# Patient Record
Sex: MALE | Race: WHITE | URBAN - METROPOLITAN AREA
[De-identification: names, ages, dates, MRNs, and addresses within clinical notes are randomized per-mention and may not be internally consistent; named-entity substitution may affect disease eponyms.]

---

## 2018-03-01 ENCOUNTER — OFFICE VISIT (OUTPATIENT)
Dept: NEUROPSYCHOLOGY | Facility: CLINIC | Age: 33
End: 2018-03-01
Payer: MEDICARE

## 2018-03-01 DIAGNOSIS — Q90.9 DOWN SYNDROME: ICD-10-CM

## 2018-03-01 DIAGNOSIS — F70 MILD INTELLECTUAL DISABILITY: ICD-10-CM

## 2018-03-01 DIAGNOSIS — R41.3 COMPLAINTS OF MEMORY DISTURBANCE: Primary | ICD-10-CM

## 2018-03-01 NOTE — MR AVS SNAPSHOT
After Visit Summary   3/1/2018    Roberto Patrick    MRN: [id number]           Patient Information     Date Of Birth          1985        Visit Information        Provider Department      3/1/2018 8:00 AM Salvatore Truong, PhD  Health Neuropsychology        Today's Diagnoses     Complaints of memory disturbance    -  1    Mild intellectual disability        Down syndrome           Follow-ups after your visit        Who to contact     Please call your clinic at [phone] to:    Ask questions about your health    Make or cancel appointments    Discuss your medicines    Learn about your test results    Speak to your doctor            Additional Information About Your Visit        MyChart Information     [employer] (powering [employer]) is an electronic gateway that provides easy, online access to your medical records. With [employer] (powering [employer]), you can request a clinic appointment, read your test results, renew a prescription or communicate with your care team.     To sign up for [employer] visit the website at [email or web address]   You will be asked to enter the access code listed below, as well as some personal information. Please follow the directions to create your username and password.     Your access code is: [email or web address]  Expires: 2018  6:30 AM     Your access code will  in 90 days. If you need help or a new code, please contact your [facility] or call [phone] for assistance.        Care EveryWhere ID     This is your Care EveryWhere ID. This could be used by other organizations to access your [place] medical records  [id number]         Blood Pressure from Last 3 Encounters:   No data found for BP    Weight from Last 3 Encounters:   No data found for Wt              We Performed the Following     [id number] TESTING, PER HR/PSYCHOLOGIST     NEUROPSYCH TESTING BY [facility]        Primary Care Provider Office Phone # Fax #    Aaron Roberson [phone]        [facility] [address]  [facility] [address]        Equal Access to Services     [facility] : Hadii cari turpin hadsandrostephania Soмария, wakelechida luabdulazizadaha, qaybta kalayda fidelsavanahpatricio, flavio platt chrisgilberto blackburnhema pandey tyson . So [facility] [phone].    ATENCIÓN: Si habla español, tiene a us disposición servicios gratuitos de asistencia lingüística. Llame al [phone].    We comply with applicable federal civil rights laws and [place] laws. We do not discriminate on the basis of race, color, national origin, age, disability, sex, sexual orientation, or gender identity.            Thank you!     Thank you for choosing [facility] NEUROPSYCHOLOGY  for your care. Our goal is always to provide you with excellent care. Hearing back from our patients is one way we can continue to improve our services. Please take a few minutes to complete the written survey that you may receive in the mail after your visit with us. Thank you!             Your Updated Medication List - Protect others around you: Learn how to safely use, store and throw away your medicines at www.disposemymeds.org.      Notice  As of 3/1/2018 11:59 PM    You have not been prescribed any medications.

## 2018-03-01 NOTE — PROGRESS NOTES
The patient was seen for neuropsychological evaluation at the request of Indira Shepherd MD for the purpose of diagnostic clarification and treatment planning.  Two hours of face to face testing were provided by this writer.  Please see Dr. Salvatore Truong's report for a full interpretation of the findings.    Lidya Gonzales  Psychometrist

## 2018-03-07 NOTE — PROGRESS NOTES
NAME: Roberto Patrick  MRN: [id number]  : 1985  PERES: 2018    Neuropsychology Laboratory  [facility]  [address]  [place]  [address] [phone]    NEUROPSYCHOLOGICAL EVALUATION    RELEVANT HISTORY AND REASON FOR REFERRAL    This is a report of neuropsychological consultation regarding Roberto Patrick, a 32-year-old, right-handed man with a diagnostic history of Down syndrome, intellectual/developmental disability (I/DD), attention-deficit/hyperactivity disorder (ADHD), epilepsy, and aortic insufficiency. His mother passed away 5 years ago from complications of early-onset Alzheimer s disease (diagnosed in her 50s). His maternal grandmother and maternal great-grandmother also had late-onset dementia. Mr. Patrick s father and stepmother have raised concerns about recent changes in his cognition. He is referred for neuropsychological assessment of brain functioning by his neurologist, Dr. Indira Shepherd in the [facility] system.     His current prescription medications include lamotrigine and atomoxetine. Per Dr. Lora s notes in 2017, his seizures are well controlled. His father reports only one known GTC event in his lifetime, plus suspected partial seizures in years prior to medication. There is no history of stroke, TBI, migraine, or other neurologic dysfunction besides the known neurodevelopmental abnormalities and seizure. His aortic insufficiency is closely monitored and asymptomatic.     Aside from the dementia history on his maternal side, family medical history includes stroke for his paternal grandfather.     His family tells me they notice longer response latencies, more frequent word finding lapses, trouble carrying out some previously routine tasks, and difficulty following even single-step commands. The changes seem reminiscent of the changes his mother demonstrated early in the course of her disease. It is notable that his  stepmother has a graduate degree in special education and is well attuned to Mr. Patrick s functioning.     Mr. Patrick s father recalls cognitive evaluations over the years placing his IQ scores in the 50s. His stepmother noted that he can read at a 3rd-grade level. He required special education services from  onward. His father pushed for him to be in partial mainstream classes. He finished high school plus a 2-year program in life skills training. He has worked for years at [employer], primarily cleaning workout equipment.     He has never lived on his own. He currently lives with his father and stepmother. He has never been  and has no children. While some routine tasks seem a little harder lately, Mr. Patrick still manages his usual set of household chores. There are no indications that cognitive problems have interfered with his work duties.    There are no major concerns about emotional health. His personality seems unchanged. He is generally well-liked and remains socially active and engaging. Mr. Patrick was euthymic throughout almost all of the evaluation. The only clear difference was immediate tearfulness upon mentioning his mother s death.     Family says his sleep is good and unchanging.     There have been no indications of hallucinatory experiences.     He does not use alcohol, tobacco, or illicit drugs.     BEHAVIORAL OBSERVATIONS    Mr. Patrick was polite and cooperative with the evaluation. As noted above, he was generally euthymic. Affective display was generally neutral but showed good range and reactivity to context. He had an enthusiastic and endearing demeanor. He was slowed in thought and action. He moved slowly, but there were no observable problems with gait or balance. Speech production was slowed and mildly dysarthric. He made numerous simple word substitutions or mix-ups, such as stating he would be going to lunch at [employer] instead of  [employer]. He was attentive. He often provided responses that were inaccurate but partially related to the initial question (e.g., How long have you worked at [employer]?  Monday through Friday, 10 o clock ) or were wholly off-topic (restated: How long have you been working there?  The first ). He often initially responded,  Yes, I do  or  I will,  regardless of the question asked. Sometimes he simply repeated parts of the questions rather than answering them. Several tasks required significant simplifications or repetitions of instructions, and comprehension of the task demands was not always assured. He showed no ability to discriminate success from challenge in testing, with statements like  I got it!  or  I am very smart!  after most test items. He was clearly engaged, and his effort and persistence were excellent. The test results are seen as valid estimations of his cognitive functioning.     MEASURES ADMINISTERED    In addition to conducting a clinical interview, I directly administered the following measures:     Test of Severe Impairment; Orientation Questionnaire: Time, Place, Basic Personal Information; Clock Drawing; MAURER Token Test; [place] Diagnostic Aphasia Exam, 3rd Edition: Commands, Automatized Sequences, Complex Ideational Material, Word Repetition, Sentence Repetition, Praxis (Natural Gestures, Conventional Gestures, Use of Pretend Objects, Buccofacial Praxis).     The following measures were administered by a trained psychometrist, under my direct supervision:    Wide Range Achievement Test 4: Word Reading, Math Computation; Wechsler Adult Intelligence Scale-IV: Similarities, Information, Vocabulary, Block Design, Matrix Reasoning, Visual Puzzles, Digit Span, Arithmetic, Symbol Search, Coding; Animal Naming Test; [place] Naming Test; Marino Visual Acuity Screen; Trail Making Test; Ochoa Verbal Learning Test, Revised; Brief Visuospatial Memory Test, Revised; Wechsler Memory Scale-IV:  Logical Memory; Finger Tapping.     RESULTS AND INTERPRETATION    Global Screening: Mr. Patrick had a score of 21/24 on the Test for Severe Impairment (TSI), a global screener like the MoCA or MMSE that is intended to have a lower performance floor. He missed items assessing counting accuracy, knowing how many weeks are in a year, and conceptualization of object similarities. He performed normally on items assessing object naming, color naming, body part identification, writing his name, basic motor skills, reciprocal social interaction, immediate attention, momentary working memory, and recent memory. Research using the TSI in the setting of Down syndrome suggests that scores higher than the low teens are not predictive of impending cognitive decline. Mr. Patrick s score of 21 lowers suspicion for encroaching degenerative decline.     Orientation: Mr. Patrick was unable to comprehend or provide appropriate responses to items assessing orientation to time. Orientation to place was limited to the city we were in. Orientation to basic personal information was better than other areas. He knew his full home address and birthdate. He was imprecise about his current age, stating,  34 next year 33.      Intellectual Abilities: As noted above, his father recalls prior testing showing IQ scores in the 50s. Current test performances are thus generally within premorbid expectations.     Abstract analogical reasoning was very impaired. Vocabulary knowledge was mildly impaired. General fund of knowledge was mildly impaired. Overall, general verbal abilities were very impaired (VCI = 52).    Visual reasoning through pattern identification was very impaired. Visuospatial reasoning through hands-on-on object assembly was mildly impaired. Visuospatial synthesis was borderline. Overall, nonverbal abilities were in the very impaired range (CLARE = 54).    Immediate auditory attention and working memory were very impaired for  repeating and rearranging digit strings. Working memory was very impaired for solving mental math problems. Overall, working memory capacity was in the very impaired range (WMI = 50).    Speeded visual scanning and matching to sample was very impaired. Speeded digit-symbol substitution was very impaired. Overall, cognitive processing speed was very impaired (PSI = 50).    Summing across the verbal, nonverbal, working memory, and processing speed tests, the overall estimate of general intelligence was in the very impaired range (FSIQ = 45).    Academic Achievement: His performance on a test of reading, pronunciation, and phonetic decoding skills was in the borderline range, with basic reading abilities commensurate to a 3rd-grade level. This is perfectly aligned with his stepmother s recollection of his previously tested levels of functioning. His performance on a test of written math skills was severely impaired, at approximately a  level.    Language & Related Skills: As noted above, basic reading and pronunciation skills were borderline. Recitation of overlearned information (e.g., the alphabet, months of the year) was impaired. Word repetition was mildly low. Sentence repetition was severely impaired. Comprehension and execution of one-step and two-step commands was generally appropriate. Comprehension and execution of commands with more than two steps was consistently impaired. Comprehension and making inferences from sentences and brief paragraphs was severely impaired. Performance on a broader test of confrontation naming was in the borderline range. Semantic verbal fluency was mildly impaired. Self-generated ideomotor praxis was impaired. Praxis for use of pretend objects was barely low. Buccofacial praxis was impaired. All praxis improved by imitation, and it was clear that reduced comprehension and conceptualization interfered with his self-generated attempts.    Perceptual & Visuoconstructional  Skills: Practical near-point visual acuity (i.e., with both eyes together and wearing glasses) was 20/70 on Marino screening. Copies of simple shapes and figures were mildly distorted but generally recognizable as the target images. Clock drawing was impaired. He sincere a clock with a 12 at the top but then numbers 1 through 17 around the clock face, and he was unable to place hands on it to represent a specified time. He was able to put hands upon a pre-drawn clock face, but his time was an accurate to what was requested.    Motor Skills: Speeded manual dexterity for finger tapping was borderline impaired with the dominant right hand. The nondominant left hand was relatively faster, with a performance in the low average range.    Mental Speed & Executive Functioning: As noted above, performances on speed tests from the intellectual abilities battery were performed in the impaired range, and speeded verbal fluency was impaired. Speeded visual scanning and graphomotor sequencing (i.e., trail making) under simple conditions was very impaired for speed but only had one error. He was unable to perform the sample items for trail making under more complex executive demands for controlling divided attention. His behavioral presentation, with tangential to entirely non-sequitur responses and apparent inability to recognize errors, also suggested executive deficits.    Learning & Anterograde Memory: Immediate verbal memory for short stories was impaired. Delayed story recall was impaired. Delayed recognition of story details was impaired. Learning a word list over repeated trials was impaired, with little improvement upon additional readings of the list. Delayed list recall was defective (0 recall). Delayed recognition of list words was defective, as he simply answered  yes  to every single target and non-list foil. Learning an array of simple shapes over repeated exposures was impaired, again with minimal trial-over-trial  improvement. Delayed free recall of the simple figures was defective (0 recall), and delayed recognition of the figures was impaired for excess false positives. It is notable that later in the day, he was shown the display again and asked to copy it, and he spontaneously stated,  I did this already with [the psychometrist].     Emotional Functioning: Psychometric assessment of emotional functioning was deferred, secondary to Mr. Patrick s limited self-report capacity.    IMPRESSIONS    The neuropsychological results are abnormal, but they are not particularly concerning for encroaching organic dysfunction. He demonstrates impairments in language skills, visuoconstructional skills, intellectual capacity, attention, working memory, executive abilities, mental speed, immediate memory, and recent memory. It is quite difficult to tell how much of his memory deficit is due to true lack of anterograde memory formation versus impaired comprehension of what is being asked of him. It is notable that he demonstrates appropriate practical levels of recent memory (e.g., recalling having worked with certain test materials earlier in the day, recalling key aspects of conversations from earlier in the day). His basic reading abilities and general intellectual abilities are aligned with reports of historic functioning from family members, suggesting no major changes. He would still qualify for a diagnosis of mild I/DD. His total score on the TSI (21/24) is well above a research-based cutoff for predicting impending cognitive decline.    In the setting of mild I/DD and Down syndrome, there is an increased incidence of dementia of the Alzheimer s type compared to the general population. It often presents initially with emotional and behavioral changes rather than demonstrable cognitive changes. I am glad that his family members are reporting no major concerns in these areas.    It is possible that the data obtained today represent  decline from previous functioning, but I have relatively low concern for substantive cognitive change from his lifelong levels. His father was going to look through their records for copies of old reports from prior cognitive testing and send them to me. If my current opinion is significantly altered once those data are received, I will file an addendum to this report and forward it to all recipients.    RECOMMENDATIONS    My basic recommendations would be to lead as healthy a lifestyle as possible and to continue monitoring areas of concern. In the context of Down syndrome, items to be on the lookout for include  --Reduced capacity for basic and instrumental ADLs  --Apathy, inactivity  --Anhedonia, dropped interests  --Social withdrawal  --Progressive memory dysfunction  --Degraded orientation/situational awareness  --New or atypical confusion/wandering  --Decreasing language comprehension  --Worsening visuospatial processing  --Increases in seizures or seizure-like behaviors    At this time, I think it is reasonable to plan on neuropsychological reevaluation on a somewhat extended timeline, perhaps in the next 18-24 months. I would be happy to see him sooner than that, if clinically indicated.    Salvatore Truong, PhD,   Clinical Neuropsychologist      Time spent:  Four hours professional time, including interview, testing, feedback, records review, data integration, and report writing (CPT 55365); an additional two hours, including testing administered by a psychometrist and interpreted by a neuropsychologist (CPT 45651). ICD-10 diagnosis: R41.3, F70, Q90.9       ----------------------  -- ADDENDUM --  --  04/26/2018   --  ----------------------  I received copies of his prior neuropsychological evaluations, including scores from August 1997, July 1999, and November 2014.     Although identical measures were not always used, Mr. Patrick's performances have been essentially equivalent at all visits. His  verbal, nonverbal, working memory, and processing speed intellectual scores (i.e., VCI, CLARE, WMI, PSI) have always been right at or near 50. His fulll-scale IQ estimates have always been in the range of 44-47. Executive functioning and anterograde memory performances have consistently been mildly to moderately impaired.     My interpretation of the present dataset and recommendations are essentially unchanged. I see no indications of major changes in cognitive capacity at this time. Down Syndrome does confer a greater risk, and serial monitoring is still indicated. The next evalution might not need to be within the next 18-24 months, but I would still want to see him within the next 5 years.     Salvatore Truong, PhD, LP

## 2018-03-08 NOTE — PROGRESS NOTES
Date: 3/1/18  Staff: OSMAR  Tech:    MM  NAME: Roberto Patrick  MRN:  [id number]  :  [birth year]  Age: 32  Sex: Male  Hand: Right     Orientation     Time  Unable to perform     Place       Personal info 3/4    WRAT4   SS %ile Grade Equiv.     Word Reading  69 2 3.8     Math Computation 55 0.1 k.5    WAIS-IV     VCI:    52 CLARE: 54      WMI: 50 PSI: 50      FSIQ: 45     Raw SS     Similarities  0 1     Vocabulary  9 3     Information   1 2      Block Design  8 2     Matrix Reasoning 2 1     Visual Puzzles  6 4       Digit Span  4 1     Arithmetic  2 1     Symbol Search 4 1      Coding  22 1    Animal Naming Test   Raw: 9  SS: 4 T: 19    [place] Naming Test   Raw:  SS: 4 T: 30    Complex Ideational Material (short form)   Raw:    BDAE Other     Commands  10/15     Auto. Seq.       Repetition   Words  8/10   Sentences 2/10     Praxis   Natural     Conventional    Pretend     Buccofacial     Clock Drawing: Impaired (- around face, no hands)  -wrong hands on pre-drawn face    Trail Making Test    Raw  Err SS T   A 96  1 1 7   B unable  -- --    HVLT-R     Trial 1 2 3   2 4 4 Raw T     Learning (1-3)    10 ?20     Delayed Recall   0 ?20     Percent Retention  0 ?20     Recognition Hits/False Positives  ?20    BVMT-R     Trial 1 2 3   1 1 2 Raw T     Learning (1-3)    4 <20     Delayed Recall   0 <20     Percent Retention  0 <1st     Recognition Hits/False Positives /3 <1st     Copy    10    WMS-IV  Raw SS/%ile     LM I  4 1     LM II  1 1     LM Recog. 17 ?2nd    Finger Tapping    Avg  SS T   RH  38.33 6 30   LH 44.33 9 41     MAURER Token Test: ; <1%ile  TSI: